# Patient Record
Sex: MALE | Race: BLACK OR AFRICAN AMERICAN | NOT HISPANIC OR LATINO | Employment: STUDENT | URBAN - METROPOLITAN AREA
[De-identification: names, ages, dates, MRNs, and addresses within clinical notes are randomized per-mention and may not be internally consistent; named-entity substitution may affect disease eponyms.]

---
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
## 2021-08-26 ENCOUNTER — HOSPITAL ENCOUNTER (EMERGENCY)
Facility: HOSPITAL | Age: 21
Discharge: HOME | End: 2021-08-26
Attending: EMERGENCY MEDICINE | Admitting: EMERGENCY MEDICINE
Payer: COMMERCIAL

## 2021-08-26 VITALS
TEMPERATURE: 99 F | OXYGEN SATURATION: 100 % | WEIGHT: 138 LBS | SYSTOLIC BLOOD PRESSURE: 132 MMHG | RESPIRATION RATE: 20 BRPM | HEART RATE: 95 BPM | DIASTOLIC BLOOD PRESSURE: 71 MMHG

## 2021-08-26 DIAGNOSIS — F41.1 ANXIETY REACTION: ICD-10-CM

## 2021-08-26 DIAGNOSIS — S50.819A FOREARM ABRASION: Primary | ICD-10-CM

## 2021-08-26 PROCEDURE — 99283 EMERGENCY DEPT VISIT LOW MDM: CPT

## 2021-08-26 PROCEDURE — 99282 EMERGENCY DEPT VISIT SF MDM: CPT | Performed by: EMERGENCY MEDICINE

## 2021-08-26 RX ORDER — GINSENG 100 MG
1 CAPSULE ORAL ONCE
Status: COMPLETED | OUTPATIENT
Start: 2021-08-26 | End: 2021-08-26

## 2021-08-26 RX ADMIN — BACITRACIN 1 SMALL APPLICATION: 500 OINTMENT TOPICAL at 17:50

## 2021-08-26 NOTE — DISCHARGE INSTRUCTIONS
Return to the ER for further concerns or worsening symptoms  Follow up with your primary care physician in 1-2 days  Apply Neosporin to the affected area twice daily

## 2021-08-26 NOTE — ED PROVIDER NOTES
History  Chief Complaint   Patient presents with    Allergic Reaction     pt reports leaving his [employer] job around 3pm and realizing he was having a hard time breathing and felt like his tongue was swelling, at that time pt noticed a bite on his arm     Patient ER with complaint of possible bite wound to his right upper extremity  Shortly after noticing the bite [place] patient felt like his hands were tingling, his tongue was swelling  Patient denies wheezing, or stridor  Patient works in [employer]  At the time of initial evaluation, patient is in no acute distress  Patient has not taken any medication prior to initial evaluation  History provided by:  Patient   used: No    Allergic Reaction  Presenting symptoms: no difficulty breathing, no difficulty swallowing, no itching, no rash, no swelling and no wheezing    Severity:  Mild  Prior allergic episodes:  No prior episodes  Context: not animal exposure, not chemicals, not cosmetics, not dairy/milk products, not eggs, not food allergies, not grass, not insect bite/sting, not jewelry/metal, not medications, not new detergents/soaps, not nuts and not poison ivy    Relieved by:  Nothing  Worsened by:  Nothing  Ineffective treatments:  None tried      Prior to Admission Medications   Prescriptions Last Dose Informant Patient Reported?  Taking?   carbamide peroxide (DEBROX) 6 5 % otic solution   No No   Sig: Administer 5 drops into ears 2 (two) times a day for 4 days      Facility-Administered Medications: None       Past Medical History:   Diagnosis Date    Asthma     No known health problems        Past Surgical History:   Procedure Laterality Date    CIRCUMCISION      elective       Family History   Problem Relation Age of Onset    Other Father         cardiac disorder    Substance Abuse Father     No Known Problems Mother     Mental illness Neg Hx      I have reviewed and agree with the history as documented  E-Cigarette/Vaping     E-Cigarette/Vaping Substances     Social History     Tobacco Use    Smoking status: Never Smoker    Smokeless tobacco: Never Used   Substance Use Topics    Alcohol use: Not on file    Drug use: Not on file       Review of Systems   Constitutional: Negative for chills and fever  HENT: Negative for ear pain, facial swelling and trouble swallowing  Eyes: Negative for pain and redness  Respiratory: Negative for cough, shortness of breath and wheezing  Cardiovascular: Negative for chest pain and palpitations  Gastrointestinal: Negative for abdominal pain, constipation, diarrhea, nausea and vomiting  Genitourinary: Negative for dysuria, flank pain, hematuria and urgency  Musculoskeletal: Negative for back pain  Skin: Positive for wound  Negative for color change, itching and rash  Neurological: Negative for dizziness and headaches  Psychiatric/Behavioral: Negative for hallucinations  The patient is nervous/anxious  All other systems reviewed and are negative  Physical Exam  Physical Exam  Vitals and nursing note reviewed  Constitutional:       Appearance: He is well-developed  HENT:      Head: Normocephalic and atraumatic  Mouth/Throat:      Pharynx: Oropharynx is clear  No oropharyngeal exudate or posterior oropharyngeal erythema  Eyes:      Extraocular Movements: Extraocular movements intact  Conjunctiva/sclera: Conjunctivae normal       Pupils: Pupils are equal, round, and reactive to light  Cardiovascular:      Rate and Rhythm: Normal rate and regular rhythm  Heart sounds: Normal heart sounds  Pulmonary:      Effort: Pulmonary effort is normal  No respiratory distress  Breath sounds: Normal breath sounds  No stridor  No wheezing, rhonchi or rales  Musculoskeletal:         General: No signs of injury  Normal range of motion  Arms:       Cervical back: Normal range of motion and neck supple     Skin: General: Skin is warm and dry  Capillary Refill: Capillary refill takes less than 2 seconds  Neurological:      General: No focal deficit present  Mental Status: He is alert and oriented to person, place, and time  Psychiatric:         Behavior: Behavior normal          Thought Content:  Thought content normal          Judgment: Judgment normal          Vital Signs  ED Triage Vitals   Temperature Pulse Respirations Blood Pressure SpO2   08/26/21 1706 08/26/21 1706 08/26/21 1706 08/26/21 1706 08/26/21 1706   99 °F (37 2 °C) 95 20 132/71 100 %      Temp Source Heart Rate Source Patient Position - Orthostatic VS BP Location FiO2 (%)   08/26/21 1706 08/26/21 1706 08/26/21 1706 08/26/21 1706 --   Temporal Monitor Sitting Right arm       Pain Score       08/26/21 1708       5           Vitals:    08/26/21 1706   BP: 132/71   Pulse: 95   Patient Position - Orthostatic VS: Sitting         Visual Acuity      ED Medications  Medications   bacitracin topical ointment 1 small application (1 small application Topical Given 8/26/21 1750)       Diagnostic Studies  Results Reviewed     None                 No orders to display              Procedures  Procedures         ED Course                                           MDM  Number of Diagnoses or Management Options  Anxiety reaction: new and requires workup  Forearm abrasion: new and requires workup  Risk of Complications, Morbidity, and/or Mortality  Presenting problems: high  Diagnostic procedures: high  Management options: high    Patient Progress  Patient progress: improved      Disposition  Final diagnoses:   Forearm abrasion   Anxiety reaction     Time reflects when diagnosis was documented in both MDM as applicable and the Disposition within this note     Time User Action Codes Description Comment    8/26/2021  5:45 PM Susan Mobley Add [S50 942Y] Forearm abrasion     8/26/2021  5:46 PM Susan NICOLE Add [F41 1] Anxiety reaction       ED Disposition     ED Disposition Condition Date/Time Comment    Discharge Stable Thu Aug 26, 2021  5:45 PM Machelle Haydenas discharge to home/self care  Follow-up Information     Follow up With Specialties Details Why Contact Info    Ernesto Ewing MD  Schedule an appointment as soon as possible for a visit in 2 days For wound re-check, for follow up [address] [place] [address]   [phone]            Discharge Medication List as of 8/26/2021  5:47 PM      CONTINUE these medications which have NOT CHANGED    Details   carbamide peroxide (DEBROX) 6 5 % otic solution Administer 5 drops into ears 2 (two) times a day for 4 days, Starting Sun 5/12/2019, Until Thu 5/16/2019, Print           No discharge procedures on file      PDMP Review     None          ED Provider  Electronically Signed by           Elizabeth Siddiqui DO  09/05/21 [id number]